# Patient Record
(demographics unavailable — no encounter records)

---

## 2025-05-08 NOTE — PLAN
[FreeTextEntry1] : 52-year-old with atrophic exam Pap HPV Mammo sono referrals given DEXA referral given Estrace vaginal cream 1 g twice weekly in the vagina prescription sent to pharmacy Pelvic sono next year 2026 to follow fibroid PHQ-9 reviewed total time 5 minutes Self breast exam encouraged

## 2025-05-08 NOTE — HISTORY OF PRESENT ILLNESS
[FreeTextEntry1] : 52-year-old -0-0-2 LMP  for routine visit.  Patient states atrophic vaginal issue improved with vaginal estrogen cream when she uses it.  [place] much more comfortable.  Patient without any other complaints 3/24 normal Pap negative HPV DEXA never  Mammo sono normal Colonoscopy  Pelvic sono  stable 4 cm fibroid ovaries and endometrium not visualized Past medical history none Past surgical history  x 2 bilateral tubal ligation and ablation with HTA  for bleeding NKDA Medications vaginal Estrace twice weekly 1 g OB history  x 2 GYN history was monthly with irregular bleeding no STDs no cysts no fibroids history of an abnormal Pap sexually active Family history no GYN or GI cancers Social history no cigarettes no marijuana patient

## 2025-05-08 NOTE — PHYSICAL EXAM
[Chaperoned Physical Exam] : A chaperone was present in the examining room during all aspects of the physical examination. [MA] : MA [FreeTextEntry2] : mendez farris [Appropriately responsive] : appropriately responsive [Alert] : alert [No Acute Distress] : no acute distress [Soft] : soft [Non-tender] : non-tender [Non-distended] : non-distended [No Mass] : no mass [Oriented x3] : oriented x3 [Examination Of The Breasts] : a normal appearance [No Masses] : no breast masses were palpable [Labia Majora] : normal [Labia Minora] : normal [Atrophy] : atrophy [Normal] : normal [Uterine Adnexae] : normal [FreeTextEntry9] : Guaiac negative

## 2025-05-08 NOTE — PROCEDURE
[Cervical Pap Smear] : cervical Pap smear [Liquid Base] : liquid base [GC Chlamydia Culture] : GC Chlamydia Culture [de-identified] : HPV